# Patient Record
Sex: FEMALE | Race: WHITE | NOT HISPANIC OR LATINO | Employment: UNEMPLOYED | URBAN - METROPOLITAN AREA
[De-identification: names, ages, dates, MRNs, and addresses within clinical notes are randomized per-mention and may not be internally consistent; named-entity substitution may affect disease eponyms.]

---

## 2021-03-17 ENCOUNTER — OFFICE VISIT (OUTPATIENT)
Dept: OBSTETRICS AND GYNECOLOGY | Facility: CLINIC | Age: 61
End: 2021-03-17

## 2021-03-17 VITALS
SYSTOLIC BLOOD PRESSURE: 120 MMHG | DIASTOLIC BLOOD PRESSURE: 84 MMHG | BODY MASS INDEX: 20.99 KG/M2 | HEIGHT: 65 IN | WEIGHT: 126 LBS

## 2021-03-17 DIAGNOSIS — Z12.39 ENCOUNTER FOR BREAST CANCER SCREENING USING NON-MAMMOGRAM MODALITY: ICD-10-CM

## 2021-03-17 DIAGNOSIS — R10.32 LEFT INGUINAL PAIN: ICD-10-CM

## 2021-03-17 DIAGNOSIS — Z13.820 OSTEOPOROSIS SCREENING: ICD-10-CM

## 2021-03-17 DIAGNOSIS — Z01.419 PAP TEST, AS PART OF ROUTINE GYNECOLOGICAL EXAMINATION: Primary | ICD-10-CM

## 2021-03-17 PROCEDURE — 99396 PREV VISIT EST AGE 40-64: CPT | Performed by: OBSTETRICS & GYNECOLOGY

## 2021-03-17 RX ORDER — BISOPROLOL FUMARATE AND HYDROCHLOROTHIAZIDE 5; 6.25 MG/1; MG/1
1 TABLET ORAL DAILY
COMMUNITY

## 2021-03-17 NOTE — PROGRESS NOTES
GYN Annual Exam     CC - Here for annual exam.     Subjective   HPI  Catherine Coe is a 60 y.o. female, , who presents for annual well woman exam.  She is postmenopausal. Her last LMP was No LMP recorded. Patient is postmenopausal..    Patient reports problems with: complaining of intermittent L groin/vaginal pain-last episode 3months.  Partner Status: Marital Status: single.  New Partners since last visit: no Desires STD Screening: no    Last mammogram:   Last Completed Mammogram       Status Date      MAMMOGRAM Done 2016 benign per patient        Last colonoscopy:   Last Completed Colonoscopy       Status Date      COLONOSCOPY No completions recorded        Last DEXA: never   Last Pap :   Last Completed Pap Smear       Status Date      PAP SMEAR Done 2017 negative with negative HPV        History of abnormal Pap smear: no    Additional OB/GYN History   Current contraception: contraceptive methods: Post menopausal status  Desires to: do not start contraception  Family history of uterine, colon, breast, or ovarian cancer: yes - mother with breast cancer age 84  Performs monthly Self-Breast Exam: yes  Parental Hip Fracture: no  Exercises Regularly: yes - yes  Feelings of Anxiety or Depression: no    Tobacco Usage?: No   OB History        1    Para        Term                AB   1    Living           SAB   1    TAB        Ectopic        Molar        Multiple        Live Births                    Health Maintenance   Topic Date Due   • Annual Gynecologic Pelvic and Breast Exam  Never done   • DXA SCAN  Never done   • COLONOSCOPY  Never done   • ANNUAL PHYSICAL  Never done   • ZOSTER VACCINE (1 of 2) Never done   • INFLUENZA VACCINE  Never done   • HEPATITIS C SCREENING  Never done   • MAMMOGRAM  2021   • PAP SMEAR  2021   • COVID-19 Vaccine (2 - Moderna 2-dose series) 2021   • TDAP/TD VACCINES (2 - Td) 2028   • Pneumococcal Vaccine 0-64  Aged Out   • MENINGOCOCCAL  "VACCINE  Aged Out       The additional following portions of the patient's history were reviewed and updated as appropriate: allergies, current medications, past family history, past medical history, past social history and past surgical history.    Review of Systems   Constitutional: Negative.    HENT: Negative.    Eyes: Negative.    Respiratory: Negative.    Cardiovascular: Negative.    Gastrointestinal: Negative.    Endocrine: Negative.    Genitourinary: Positive for pelvic pain.   Musculoskeletal: Negative.    Skin: Negative.    Allergic/Immunologic: Negative.    Neurological: Negative.    Hematological: Negative.    Psychiatric/Behavioral: Negative.        I have reviewed and agree with the HPI, ROS, and historical information as entered above. Chely Mcmahon MD    Objective   /84   Ht 165.1 cm (65\")   Wt 57.2 kg (126 lb)   BMI 20.97 kg/m²     Physical Exam  Vitals and nursing note reviewed. Exam conducted with a chaperone present.   Constitutional:       Appearance: She is well-developed.   HENT:      Head: Normocephalic and atraumatic.   Neck:      Thyroid: No thyroid mass or thyromegaly.   Cardiovascular:      Rate and Rhythm: Normal rate and regular rhythm.      Heart sounds: No murmur heard.     Pulmonary:      Effort: Pulmonary effort is normal. No retractions.      Breath sounds: Normal breath sounds. No wheezing, rhonchi or rales.   Chest:      Chest wall: No mass or tenderness.      Breasts:         Right: Normal. No mass, nipple discharge, skin change or tenderness.         Left: Normal. No mass, nipple discharge, skin change or tenderness.   Abdominal:      General: Bowel sounds are normal.      Palpations: Abdomen is soft. Abdomen is not rigid. There is no mass.      Tenderness: There is no abdominal tenderness. There is no guarding.      Hernia: No hernia is present. There is no hernia in the left inguinal area.   Genitourinary:     Labia:         Right: No rash, tenderness or lesion.    "      Left: No rash, tenderness or lesion.       Vagina: Normal. No vaginal discharge or lesions.      Cervix: No cervical motion tenderness, discharge, lesion or cervical bleeding.      Uterus: Normal. Not enlarged, not fixed and not tender.       Adnexa:         Right: No mass or tenderness.          Left: No mass or tenderness.        Rectum: No external hemorrhoid.   Musculoskeletal:      Cervical back: Normal range of motion. No muscular tenderness.   Neurological:      Mental Status: She is alert and oriented to person, place, and time.   Psychiatric:         Behavior: Behavior normal.           Assessment/Plan     Encounter Diagnoses   Name Primary?   • Pap test, as part of routine gynecological examination Yes   • Left inguinal pain    • Encounter for breast cancer screening using non-mammogram modality    • Osteoporosis screening        Recommended use of Vitamin D replacement and getting adequate calcium in her diet. (1500mg)  Will return for BDS  Reviewed monthly self breast exams.  Instructed to call with lumps, pain, or breast discharge.    Strongly encouraged to start yearly mammography  Reviewed HPV guidelines.  Reviewed exercise as a preventative health measures.   Left groin/vulvar pain - will return for u/s.    Chely Mcmahon MD   03/17/2021

## 2021-03-24 DIAGNOSIS — Z01.419 PAP TEST, AS PART OF ROUTINE GYNECOLOGICAL EXAMINATION: ICD-10-CM

## 2021-04-12 ENCOUNTER — OFFICE VISIT (OUTPATIENT)
Dept: OBSTETRICS AND GYNECOLOGY | Facility: CLINIC | Age: 61
End: 2021-04-12

## 2021-04-12 VITALS
WEIGHT: 126.4 LBS | BODY MASS INDEX: 21.06 KG/M2 | HEIGHT: 65 IN | SYSTOLIC BLOOD PRESSURE: 124 MMHG | TEMPERATURE: 96.8 F | DIASTOLIC BLOOD PRESSURE: 74 MMHG

## 2021-04-12 DIAGNOSIS — R10.32 LEFT INGUINAL PAIN: ICD-10-CM

## 2021-04-12 DIAGNOSIS — D25.9 UTERINE LEIOMYOMA, UNSPECIFIED LOCATION: Primary | ICD-10-CM

## 2021-04-12 PROCEDURE — 76830 TRANSVAGINAL US NON-OB: CPT | Performed by: OBSTETRICS & GYNECOLOGY

## 2021-04-12 PROCEDURE — 99213 OFFICE O/P EST LOW 20 MIN: CPT | Performed by: NURSE PRACTITIONER

## 2021-04-12 NOTE — PROGRESS NOTES
Chief Complaint   Patient presents with   • Follow-up     Left Inguinal Pain       Subjective   HPI  Catherine Coe is a 60 y.o. female, , who presents for follow up on L inguinal pain/vulvar pain. Patient was seen with Dr. Mcmahon on 2021 for this, recommended TVUS that was performed today.      She states she has experienced this problem intermittently for 3-4 months.  She describes the severity as mild.  She states that the problem is annoying and intermittent.  The patient reports additional symptoms as none.      Her last LMP was No LMP recorded (lmp unknown). Patient is postmenopausal..  Periods are absent, secondary to postmenopausal status. Patient reports problems with: none.  Partner Status: Marital Status: single.  New Partners since last visit: no.  Desires STD Screening: no.    Additional OB/GYN History   Current contraception: contraceptive methods: Post menopausal status  Desires to: do not start contraception  Last Pap :   Last Completed Pap Smear       Status Date      PAP SMEAR Done 3/24/2021 PAP IG, HPV-HR ([facility])     Patient has more history with this topic...        History of abnormal Pap smear: no  Last mammogram:   Last Completed Mammogram       Status Date      MAMMOGRAM Done 2016 benign per patient        Tobacco Usage?: No   OB History        1    Para        Term                AB   1    Living           SAB   1    TAB        Ectopic        Molar        Multiple        Live Births                    Health Maintenance   Topic Date Due   • DXA SCAN  Never done   • COLONOSCOPY  Never done   • ANNUAL PHYSICAL  Never done   • ZOSTER VACCINE (1 of 2) Never done   • HEPATITIS C SCREENING  Never done   • MAMMOGRAM  2021   • COVID-19 Vaccine (2 - Moderna 2-dose series) 2021   • INFLUENZA VACCINE  2021   • Annual Gynecologic Pelvic and Breast Exam  2022   • PAP SMEAR  2024   • TDAP/TD VACCINES (2 - Td) 2028   • Pneumococcal Vaccine  "0-64  Aged Out   • MENINGOCOCCAL VACCINE  Aged Out       The additional following portions of the patient's history were reviewed and updated as appropriate: allergies, current medications, past family history, past medical history, past social history, past surgical history and problem list.    Review of Systems   Constitutional: Negative for appetite change, fatigue, unexpected weight gain and unexpected weight loss.   HENT: Negative for congestion, nosebleeds, sinus pressure and sore throat.    Eyes: Negative for visual disturbance.   Respiratory: Negative for cough and shortness of breath.    Cardiovascular: Negative for chest pain, palpitations and leg swelling.   Gastrointestinal: Negative for abdominal pain, constipation, diarrhea, nausea and vomiting.   Endocrine: Negative for cold intolerance, heat intolerance, polydipsia and polyuria.   Genitourinary: Positive for pelvic pain. Negative for breast discharge, dysuria, frequency, genital sores and urgency.   Musculoskeletal: Negative for joint swelling and myalgias.   Skin: Negative for rash, skin lesions and bruise.   Neurological: Negative for weakness, numbness and headache.   Hematological: Negative for adenopathy. Does not bruise/bleed easily.   Psychiatric/Behavioral: Negative for sleep disturbance and suicidal ideas.       I have reviewed and agree with the HPI, ROS, and historical information as entered above. Lissa Caro, APRN    Objective   /74 (BP Location: Right arm, Patient Position: Sitting, Cuff Size: Adult)   Temp 96.8 °F (36 °C)   Ht 165.1 cm (65\")   Wt 57.3 kg (126 lb 6.4 oz)   LMP  (LMP Unknown)   Breastfeeding No   BMI 21.03 kg/m²     Physical Exam    Assessment/Plan     Assessment     Problem List Items Addressed This Visit        Genitourinary and Reproductive     Uterine leiomyoma - Primary    Overview     3 fibroids totaling approximately 8 cm.  Repeat ultrasound in 3 months.  #1 3.5 cm  #2 2.5 cm  #3 1.5 cm         " Relevant Orders    US Non-ob Transvaginal      Other Visit Diagnoses     Left inguinal pain        Relevant Orders    US Non-ob Transvaginal          1. Left inguinal pain is intermittent and mild in nature.  She notices exacerbation with heavy lifting or strenuous activity.  I do think it is possible that the fibroids are causing pressure in this area, however I cannot be sure.  Exam at her annual was normal per LOS.  Her Pap was normal.  2. She has no known history of fibroids but has never had a pelvic ultrasound in the past.  Plan to repeat in 3 months.    Plan     Return in about 3 months (around 7/12/2021) for F/U fibroids- U/S LOS.  2.  Advised daily Kegel exercises, frequency and instructions were given.  We also discussed the option of estrogen vaginal cream.  She will try Kegels for now and consider at her follow-up appointment.      Lissa Caro, APRN  04/12/2021

## 2021-08-20 ENCOUNTER — OFFICE VISIT (OUTPATIENT)
Dept: OBSTETRICS AND GYNECOLOGY | Facility: CLINIC | Age: 61
End: 2021-08-20

## 2021-08-20 VITALS
BODY MASS INDEX: 21.23 KG/M2 | WEIGHT: 127.4 LBS | SYSTOLIC BLOOD PRESSURE: 120 MMHG | DIASTOLIC BLOOD PRESSURE: 98 MMHG | HEIGHT: 65 IN

## 2021-08-20 DIAGNOSIS — D21.9 FIBROIDS: Primary | ICD-10-CM

## 2021-08-20 PROCEDURE — 99212 OFFICE O/P EST SF 10 MIN: CPT | Performed by: OBSTETRICS & GYNECOLOGY

## 2021-08-20 NOTE — PROGRESS NOTES
Chief Complaint   Patient presents with   • Follow-up       Subjective   HPI  Catherine Thomas is a 60 y.o. female, , who presents for follow up U/S for uterine fibroids, post-office visit 21 with complaint of intermittent left inguinal/vulvar pain (see note).      She states that the problem is resolving; she has not had any discomfort pain for the past 2-3 months.  The patient reports additional symptoms as none.      No LMP recorded (lmp unknown). Patient is postmenopausal. Partner Status: Marital Status: single.  New Partners since last visit: no.  Desires STD Screening: no.    Additional OB/GYN History   Current contraception: contraceptive methods: Post menopausal status    Last Pap :   Last Completed Pap Smear          PAP SMEAR (Every 3 Years) Next due on 3/24/2024    2021  Pap IG, HPV-hr    2017  Done - negative with negative HPV              History of abnormal Pap smear: no  Last mammogram:   Last Completed Mammogram     This patient has no relevant Health Maintenance data.        Tobacco Usage?: No   OB History        1    Para   0    Term   0       0    AB   1    Living   0       SAB   1    TAB   0    Ectopic   0    Molar   0    Multiple   0    Live Births   0                Health Maintenance   Topic Date Due   • DXA SCAN  Never done   • COLORECTAL CANCER SCREENING  Never done   • ANNUAL PHYSICAL  Never done   • ZOSTER VACCINE (1 of 2) Never done   • HEPATITIS C SCREENING  Never done   • MAMMOGRAM  2021   • COVID-19 Vaccine (2 - Moderna 2-dose series) 2021   • INFLUENZA VACCINE  10/01/2021   • Annual Gynecologic Pelvic and Breast Exam  2022   • PAP SMEAR  2024   • TDAP/TD VACCINES (2 - Td or Tdap) 2028   • Pneumococcal Vaccine 0-64  Aged Out       The additional following portions of the patient's history were reviewed and updated as appropriate: allergies, current medications, past family history, past medical history, past  "social history, past surgical history and problem list.    Review of Systems   Constitutional: Negative.    HENT: Negative.    Eyes: Negative.    Respiratory: Negative.    Cardiovascular: Negative.    Gastrointestinal: Negative.    Endocrine: Negative.    Genitourinary: Negative.    Musculoskeletal: Negative.    Skin: Negative.    Allergic/Immunologic: Negative.    Neurological: Negative.    Hematological: Negative.    Psychiatric/Behavioral: Negative.        I have reviewed and agree with the HPI, ROS, and historical information as entered above. Chely Mcmahon MD    Objective   /98 (BP Location: Right arm, Patient Position: Sitting, Cuff Size: Adult)   Ht 165.1 cm (65\")   Wt 57.8 kg (127 lb 6.4 oz)   LMP  (LMP Unknown)   BMI 21.20 kg/m²     Physical Exam  Constitutional:       Appearance: She is well-developed.   HENT:      Head: Normocephalic.   Eyes:      Conjunctiva/sclera: Conjunctivae normal.   Pulmonary:      Effort: Pulmonary effort is normal.   Psychiatric:         Behavior: Behavior normal.         Assessment/Plan     Assessment     Problem List Items Addressed This Visit     None      Visit Diagnoses     Fibroids    -  Primary    Relevant Orders    US Non-ob Transvaginal            Plan     1.  Fibroids are stable and her pain has essentially resolved.  She will f/u in 6 months for annual and last q 6 month u/s.    Chely Mcmahon MD  08/20/2021  "

## 2022-06-03 ENCOUNTER — OFFICE VISIT (OUTPATIENT)
Dept: OBSTETRICS AND GYNECOLOGY | Facility: CLINIC | Age: 62
End: 2022-06-03

## 2022-06-03 VITALS
BODY MASS INDEX: 20.83 KG/M2 | WEIGHT: 125 LBS | HEIGHT: 65 IN | DIASTOLIC BLOOD PRESSURE: 78 MMHG | SYSTOLIC BLOOD PRESSURE: 126 MMHG

## 2022-06-03 DIAGNOSIS — Z01.419 WOMEN'S ANNUAL ROUTINE GYNECOLOGICAL EXAMINATION: ICD-10-CM

## 2022-06-03 DIAGNOSIS — N63.11 BREAST LUMP ON RIGHT SIDE AT 10 O'CLOCK POSITION: Primary | ICD-10-CM

## 2022-06-03 PROCEDURE — 99396 PREV VISIT EST AGE 40-64: CPT | Performed by: NURSE PRACTITIONER

## 2022-06-03 RX ORDER — LOSARTAN POTASSIUM 25 MG/1
25 TABLET ORAL DAILY
COMMUNITY
Start: 2022-04-14

## 2022-06-03 RX ORDER — ACYCLOVIR 400 MG/1
400 TABLET ORAL DAILY
Qty: 90 TABLET | Refills: 4 | Status: SHIPPED | OUTPATIENT
Start: 2022-06-03

## 2022-06-03 NOTE — PROGRESS NOTES
GYN Annual Exam     CC - Here for annual exam.     Subjective   HPI  Catherine Coe is a 61 y.o. female, , who presents for annual well woman exam.  She is postmenopausal.  Patient reports problems with: none.  Partner Status: single.  New Partners since last visit: no.      Additional OB/GYN History   Current contraception: contraceptive methods: Post menopausal status  Desires to: do not start contraception  Last Pap : Neg HPV: Neg   Last Completed Pap Smear          PAP SMEAR (Every 3 Years) Next due on 3/24/2024    2021  Pap IG, HPV-hr    2017  Done - negative with negative HPV              History of abnormal Pap smear: no  Family history of uterine, colon, breast, or ovarian cancer: yes - Mother-Breast  Performs monthly Self-Breast Exam: yes  Last mammogram: 2016  Last Completed Mammogram     This patient has no relevant Health Maintenance data.        Last colonoscopy: never   Last Completed Colonoscopy     This patient has no relevant Health Maintenance data.        Last DEXA: Never    Exercises Regularly: yes  Feelings of Anxiety or Depression: no    Tobacco Usage?: No   OB History        1    Para   0    Term   0       0    AB   1    Living   0       SAB   1    IAB   0    Ectopic   0    Molar   0    Multiple   0    Live Births   0                Health Maintenance   Topic Date Due   • DXA SCAN  Never done   • COLORECTAL CANCER SCREENING  Never done   • ANNUAL PHYSICAL  Never done   • COVID-19 Vaccine (1) Never done   • ZOSTER VACCINE (1 of 2) Never done   • MAMMOGRAM  2017   • HEPATITIS C SCREENING  Never done   • Annual Gynecologic Pelvic and Breast Exam  2022   • INFLUENZA VACCINE  2022   • PAP SMEAR  2024   • TDAP/TD VACCINES (2 - Td or Tdap) 2028   • Pneumococcal Vaccine 0-64  Aged Out       The additional following portions of the patient's history were reviewed and updated as appropriate: allergies, current medications, past family  "history, past medical history, past social history and past surgical history.    Review of Systems   Constitutional: Negative.    Respiratory: Negative.    Cardiovascular: Negative.    Gastrointestinal: Negative.    Genitourinary: Negative.    Neurological: Negative.        I have reviewed and agree with the HPI, ROS, and historical information as entered above. Lissa Cavazoskman, APRN    Objective   /78 (BP Location: Left arm, Patient Position: Sitting, Cuff Size: Adult)   Ht 165.1 cm (65\")   Wt 56.7 kg (125 lb)   LMP  (LMP Unknown)   Breastfeeding No   BMI 20.80 kg/m²     Physical Exam  Vitals and nursing note reviewed. Exam conducted with a chaperone present.   Constitutional:       Appearance: She is well-developed.   HENT:      Head: Normocephalic and atraumatic.   Neck:      Thyroid: No thyroid mass or thyromegaly.   Cardiovascular:      Rate and Rhythm: Normal rate and regular rhythm.      Heart sounds: No murmur heard.  Pulmonary:      Effort: Pulmonary effort is normal. No retractions.      Breath sounds: Normal breath sounds. No wheezing, rhonchi or rales.   Chest:      Chest wall: No mass or tenderness.   Breasts:      Right: Mass present. No nipple discharge, skin change or tenderness.      Left: Normal. No mass, nipple discharge, skin change or tenderness.        Comments: Right breast lump at 10:00, 2 cm from areola, 1 cm in size, firm, mobile, nontender   Abdominal:      Palpations: Abdomen is soft. Abdomen is not rigid. There is no mass.      Tenderness: There is no abdominal tenderness. There is no guarding.      Hernia: No hernia is present. There is no hernia in the left inguinal area.   Genitourinary:     Labia:         Right: No rash, tenderness or lesion.         Left: No rash, tenderness or lesion.       Vagina: Normal. No vaginal discharge or lesions.      Cervix: No cervical motion tenderness, discharge, lesion or cervical bleeding.      Uterus: Normal. Not enlarged, not fixed and " not tender.       Adnexa:         Right: No mass or tenderness.          Left: No mass or tenderness.        Rectum: No external hemorrhoid.   Musculoskeletal:      Cervical back: Normal range of motion. No muscular tenderness.   Neurological:      Mental Status: She is alert and oriented to person, place, and time.   Psychiatric:         Behavior: Behavior normal.         Assessment & Plan     Assessment     Problem List Items Addressed This Visit    None     Visit Diagnoses     Breast lump on right side at 10 o'clock position    -  Primary    Relevant Orders    Mammo Diagnostic Digital Tomosynthesis Bilateral With CAD    Women's annual routine gynecological examination              1. GYN annual well woman exam.   2. Recommended bone density scan and colonoscopy when she is insured.    Plan     1. Reviewed monthly self breast exams.  Instructed to call with lumps, pain, or breast discharge.  Yearly mammograms ordered.  2. Ordered mammogram today.  3. Recommended use of Vitamin D and getting adequate calcium in her diet. (1500mg)  4. Colonoscopy recommended.  5. RTC in 1 year or PRN with problems.   6. Proceed with diagnostic mammogram, importance of compliance was reviewed with the patient.  She wants to go where she was seen for her mammogram in 2016.  She will call with that location and we will get that scheduled.    Lissa Caro, APRN  06/03/2022

## 2022-06-07 ENCOUNTER — TELEPHONE (OUTPATIENT)
Dept: OBSTETRICS AND GYNECOLOGY | Facility: CLINIC | Age: 62
End: 2022-06-07

## 2024-09-06 ENCOUNTER — TELEPHONE (OUTPATIENT)
Dept: OBSTETRICS AND GYNECOLOGY | Facility: CLINIC | Age: 64
End: 2024-09-06
Payer: COMMERCIAL

## 2024-09-06 DIAGNOSIS — B00.9 HSV INFECTION: Primary | ICD-10-CM

## 2024-09-06 RX ORDER — ACYCLOVIR 400 MG/1
400 TABLET ORAL DAILY
Qty: 90 TABLET | Refills: 0 | Status: SHIPPED | OUTPATIENT
Start: 2024-09-06

## 2024-09-06 NOTE — TELEPHONE ENCOUNTER
Pt states she needs a refill due to having COVID two weeks ago and having an outbreak. She states she normally takes 1 a day but needed to increase her dose due to the outbreak. I let her know I will confirm with provider to get it sent in. She VU